# Patient Record
Sex: FEMALE | Race: WHITE
[De-identification: names, ages, dates, MRNs, and addresses within clinical notes are randomized per-mention and may not be internally consistent; named-entity substitution may affect disease eponyms.]

---

## 2019-07-25 ENCOUNTER — HOSPITAL ENCOUNTER (EMERGENCY)
Dept: HOSPITAL 25 - UCEAST | Age: 84
Discharge: HOME | End: 2019-07-25
Payer: MEDICARE

## 2019-07-25 VITALS — DIASTOLIC BLOOD PRESSURE: 100 MMHG | SYSTOLIC BLOOD PRESSURE: 143 MMHG

## 2019-07-25 DIAGNOSIS — F42.4: ICD-10-CM
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
## 2019-07-25 DIAGNOSIS — S01.01XD: Primary | ICD-10-CM

## 2019-07-25 DIAGNOSIS — F03.90: ICD-10-CM

## 2019-07-25 PROCEDURE — G0463 HOSPITAL OUTPT CLINIC VISIT: HCPCS

## 2019-07-25 PROCEDURE — 99202 OFFICE O/P NEW SF 15 MIN: CPT

## 2019-07-25 NOTE — UC
Laceration HPI





- HPI Summary


HPI Summary: 





86 yo female presents accompanied by daughter for staple removal. Pt suffers 

from dementia, thus the history is provided by the daughter. She tells me that 

pt fell 1 week ago and was seen at an ER (not [facility]) and had imaging which was 

normal. Had 1 staple placed to a small scalp laceration. Also, pt has been 

itching for years and has some excoriations and skin tears to her forearms due 

to this. 





- History Of Current Complaint


Stated Complaint: SUTURE REMOVAL


Time Seen by Provider: 07/25/19 14:03


Hx Obtained From: Family/Caretaker


Hx From Patient Unobtainable Due To: Dementia


Laceration Location: Head


Mechanism Of Injury: Blunt Trauma





- Allergies/Home Medications


Allergies/Adverse Reactions: 


 Allergies











Allergy/AdvReac Type Severity Reaction Status Date / Time


 


ciprofloxacin [From Cipro] Allergy  Rash Verified 07/25/19 14:08


 


fentanyl Allergy  Rash Verified 07/25/19 14:08














PMH/Surg Hx/FS Hx/Imm Hx


Neurological History: Dementia





- Surgical History


Surgical History: Unable to Obtain/Confirm - daughter unsure





- Family History


Known Family History: Positive: Non-Contributory





- Social History


Occupation: Retired


Lives: With Family


Alcohol Use: None


Substance Use Type: None


Smoking Status (MU): Never Smoked Tobacco





Review of Systems


All Other Systems Reviewed And Are Negative: Yes


Constitutional: Positive: Negative


Skin: Positive: Other - One staple in place scalp. Excoriations b/l forearms


Respiratory: Positive: Negative


Cardiovascular: Positive: Negative


Neurovascular: Positive: Negative


Neurological: Positive: Negative


Psychological: Positive: Negative





Physical Exam





- Summary


Physical Exam Summary: 





GENERAL: NAD. WDWN. No pain distress.


SKIN: Posterior scalp with 1 staple in place. Laceration healed well with good 

approximation. No bleeding, drainage, or tenderness. B/L forearms scattered 5mm 

superficial skin avulsions due to itching and excoriations. No drainage, 

streaking, edema, erythema.


CHEST:  No accessory muscle use. Breathing comfortably and in no distress.


CV:  Pulses intact. Cap refill <2seconds


NEURO: Alert.


PSYCH: Age appropriate behavior.





Triage Information Reviewed: Yes


Vital Signs: 





Vital Signs:











Temp Pulse Resp BP Pulse Ox


 


 98 F   100   20   143/100   100 


 


 07/25/19 14:02  07/25/19 14:02  07/25/19 14:02  07/25/19 14:02  07/25/19 14:02











Vital Signs Reviewed: Yes





Laceration Course/Dx





- Course/Dx


Course Of Treatment: 





1 staple removed without difficulty.


Skin avulsions/excoriations - neosporin applied and bandaged with telfa and 

kerlix. Advised to change dressings daily until well healed and f/u with PCP 

next week. 





- Diagnosis


Provider Diagnosis: 


 Removal of staple, Excoriation of forearm








Discharge





- Sign-Out/Discharge


Documenting (check all that apply): Patient Departure


All imaging exams completed and their final reports reviewed: No Studies





- Discharge Plan


Condition: Stable


Disposition: HOME


Patient Education Materials:  Stitches Removal (ED)


Referrals: 


No Primary Care Phys,NOPCP [Primary Care Provider] - 


Additional Instructions: 


If you develop a fever, shortness of breath, chest pain, new or worsening 

symptoms - please call your PCP or go to the ED immediately.


 





Your blood pressure was high at todays visit. Please see your primary provider 

within 4 weeks for recheck and re-evaluation.








- Billing Disposition and Condition


Condition: STABLE


Disposition: Home





- Attestation Statements


Provider Attestation: 





Per institutional requirements, I have reviewed the chart, however, I was not 

consulted specifically or made aware of this patient by the  midlevel provider.

  I did not personally evaluate, interact with , or disposition  this patient.

## 2020-12-10 ENCOUNTER — OFFICE VISIT (OUTPATIENT)
Dept: URBAN - METROPOLITAN AREA CLINIC 27 | Facility: CLINIC | Age: 85
End: 2020-12-10
Payer: MEDICARE

## 2020-12-10 DIAGNOSIS — H04.123 DRY EYE SYNDROME OF BILATERAL LACRIMAL GLANDS: ICD-10-CM

## 2020-12-10 PROCEDURE — 92014 COMPRE OPH EXAM EST PT 1/>: CPT | Performed by: OPHTHALMOLOGY

## 2020-12-10 PROCEDURE — 92134 CPTRZ OPH DX IMG PST SGM RTA: CPT | Performed by: OPHTHALMOLOGY

## 2020-12-10 ASSESSMENT — INTRAOCULAR PRESSURE
OD: 13
OS: 10

## 2020-12-10 NOTE — IMPRESSION/PLAN
Impression: Dry eye syndrome of bilateral lacrimal glands: H04.123. OU. Plan: Patient notes a decline in vision OD. PEE present on exam. Recommend switch to PF- AFT's as needed for comfort. May consider allergy drop.

## 2020-12-10 NOTE — IMPRESSION/PLAN
Impression: Bilateral nonexudative age-related macular degeneration, advanced atrophic w/ subfoveal involvement: H35.3134. OD>OS. Status: Symptomatic. Plan: Exam and OCT confirm the presence of RPE changes and atrophy are consistent with with GA. The diagnosis, natural history, and prognosis of dry AMD were discussed at length. The patient was instructed to continue taking AREDS2 vitamins and informed that smoking increases the risk of blindness for this disease. The patient was educated on the proper use of the Amsler grid and encouraged to use it daily to monitor the vision in each eye. The patient was instructed to call our office immediately upon any decreased vision or increased distortion. 


RTC: 4 mo FU with OCT,OU Left arm;

## 2021-03-03 ENCOUNTER — OFFICE VISIT (OUTPATIENT)
Dept: URBAN - METROPOLITAN AREA CLINIC 13 | Facility: CLINIC | Age: 86
End: 2021-03-03
Payer: MEDICARE

## 2021-03-03 PROCEDURE — 92134 CPTRZ OPH DX IMG PST SGM RTA: CPT | Performed by: OPHTHALMOLOGY

## 2021-03-03 PROCEDURE — 92014 COMPRE OPH EXAM EST PT 1/>: CPT | Performed by: OPHTHALMOLOGY

## 2021-03-03 ASSESSMENT — INTRAOCULAR PRESSURE
OS: 12
OD: 12

## 2021-03-03 NOTE — IMPRESSION/PLAN
Impression: Bilateral nonexudative age-related macular degeneration, advanced atrophic w/ subfoveal involvement: H35.3134. OD>OS. Status: Symptomatic. Plan: Exam and OCT confirm the presence of RPE changes and atrophy are consistent with with GA. The diagnosis, natural history, and prognosis of dry AMD were discussed at length. The patient was instructed to continue taking AREDS2 vitamins and informed that smoking increases the risk of blindness for this disease. The patient was educated on the proper use of the Amsler grid and encouraged to use it daily to monitor the vision in each eye. The patient was instructed to call our office immediately upon any decreased vision or increased distortion. 

Will refer for IONIS trial OS

RTC: 4 mo FU with OCT,OU

## 2021-07-07 ENCOUNTER — OFFICE VISIT (OUTPATIENT)
Dept: URBAN - METROPOLITAN AREA CLINIC 13 | Facility: CLINIC | Age: 86
End: 2021-07-07
Payer: MEDICARE

## 2021-07-07 DIAGNOSIS — H35.3134 BILATERAL NONEXUDATIVE AGE-RELATED MACULAR DEGENERATION, ADVANCED ATROPHIC W/ SUBFOVEAL INVOLVEMENT: Primary | ICD-10-CM

## 2021-07-07 PROCEDURE — 92014 COMPRE OPH EXAM EST PT 1/>: CPT | Performed by: OPHTHALMOLOGY

## 2021-07-07 PROCEDURE — 92134 CPTRZ OPH DX IMG PST SGM RTA: CPT | Performed by: OPHTHALMOLOGY

## 2021-07-07 ASSESSMENT — INTRAOCULAR PRESSURE
OS: 9
OD: 9

## 2021-07-07 NOTE — IMPRESSION/PLAN
Impression: Bilateral nonexudative age-related macular degeneration, advanced atrophic w/ subfoveal involvement: H35.3134. OD>OS. Status: Symptomatic. Plan: Exam and OCT confirm the presence of RPE changes and atrophy are consistent with with GA. The diagnosis, natural history, and prognosis of dry AMD were discussed at length. The patient was instructed to continue taking AREDS2 vitamins and informed that smoking increases the risk of blindness for this disease. The patient was educated on the proper use of the Amsler grid and encouraged to use it daily to monitor the vision in each eye. The patient was instructed to call our office immediately upon any decreased vision or increased distortion. Will refer for IONIS trial OS (was scheduled for screening in March, but she had a fall) RTC: 4 mo FU with OCT OU (if she does not qualify)

## 2021-11-18 ENCOUNTER — OFFICE VISIT (OUTPATIENT)
Dept: URBAN - METROPOLITAN AREA CLINIC 7 | Facility: CLINIC | Age: 86
End: 2021-11-18
Payer: MEDICARE

## 2021-11-18 DIAGNOSIS — Z96.1 PRESENCE OF INTRAOCULAR LENS: ICD-10-CM

## 2021-11-18 PROCEDURE — 92134 CPTRZ OPH DX IMG PST SGM RTA: CPT | Performed by: OPHTHALMOLOGY

## 2021-11-18 PROCEDURE — 92014 COMPRE OPH EXAM EST PT 1/>: CPT | Performed by: OPHTHALMOLOGY

## 2021-11-18 RX ORDER — DIVALPROEX SODIUM 500 MG/1
500 MG TABLET, DELAYED RELEASE ORAL
Qty: 0 | Refills: 0 | Status: ACTIVE
Start: 2021-11-18

## 2021-11-18 ASSESSMENT — INTRAOCULAR PRESSURE
OS: 14
OD: 12

## 2021-11-18 NOTE — IMPRESSION/PLAN
Impression: Bilateral nonexudative age-related macular degeneration, advanced atrophic w/ subfoveal involvement: H35.3134. OD>OS. Status: Symptomatic. Bilateral. Plan: Returns reporting worsening vision. Exam/OCT reveals RPE atrophy with no evidence of CNV OU. Discussed findings and natural history f condition with patient and son. Discussed risk for developing NV AMD.  Will continue to monitor.    

Return in 3 months, OCT OU

## 2021-11-18 NOTE — IMPRESSION/PLAN
Impression: Dry eye syndrome of bilateral lacrimal glands: H04.123. OU. Bilateral. Plan: Recommend frequent ATs.

## 2022-04-20 ENCOUNTER — OFFICE VISIT (OUTPATIENT)
Dept: URBAN - METROPOLITAN AREA CLINIC 7 | Facility: CLINIC | Age: 87
End: 2022-04-20
Payer: MEDICARE

## 2022-04-20 DIAGNOSIS — H04.123 DRY EYE SYNDROME OF BILATERAL LACRIMAL GLANDS: ICD-10-CM

## 2022-04-20 DIAGNOSIS — H54.8 LEGAL BLINDNESS, AS DEFINED IN USA: ICD-10-CM

## 2022-04-20 DIAGNOSIS — Z96.1 PRESENCE OF INTRAOCULAR LENS: ICD-10-CM

## 2022-04-20 DIAGNOSIS — H35.3134 BILATERAL NONEXUDATIVE AGE-RELATED MACULAR DEGENERATION, ADVANCED ATROPHIC W/ SUBFOVEAL INVOLVEMENT: Primary | ICD-10-CM

## 2022-04-20 PROCEDURE — 99214 OFFICE O/P EST MOD 30 MIN: CPT | Performed by: OPHTHALMOLOGY

## 2022-04-20 PROCEDURE — 92134 CPTRZ OPH DX IMG PST SGM RTA: CPT | Performed by: OPHTHALMOLOGY

## 2022-04-20 ASSESSMENT — INTRAOCULAR PRESSURE
OD: 17
OS: 14

## 2022-04-20 NOTE — IMPRESSION/PLAN
Impression: Bilateral nonexudative age-related macular degeneration, advanced atrophic w/ subfoveal involvement: H35.3134. OD>OS. Status: Symptomatic. Bilateral. Plan: Returns reporting worsening vision. Exam/OCT reveals RPE atrophy with no evidence of CNV OU. Discussed findings and natural history f condition with patient and son. Discussed risk for developing NV AMD.  Will continue to monitor.    

Return in 9-12 months, OCT OU

## 2022-08-30 ENCOUNTER — OFFICE VISIT (OUTPATIENT)
Dept: URBAN - METROPOLITAN AREA CLINIC 13 | Facility: CLINIC | Age: 87
End: 2022-08-30
Payer: MEDICARE

## 2022-08-30 DIAGNOSIS — H35.3134 BILATERAL NONEXUDATIVE AGE-RELATED MACULAR DEGENERATION, ADVANCED ATROPHIC W/ SUBFOVEAL INVOLVEMENT: Primary | ICD-10-CM

## 2022-08-30 DIAGNOSIS — Z96.1 PRESENCE OF INTRAOCULAR LENS: ICD-10-CM

## 2022-08-30 PROCEDURE — 99214 OFFICE O/P EST MOD 30 MIN: CPT | Performed by: OPHTHALMOLOGY

## 2022-08-30 PROCEDURE — 92134 CPTRZ OPH DX IMG PST SGM RTA: CPT | Performed by: OPHTHALMOLOGY

## 2022-08-30 ASSESSMENT — INTRAOCULAR PRESSURE
OS: 9
OD: 21

## 2022-08-30 NOTE — IMPRESSION/PLAN
Impression: Bilateral nonexudative age-related macular degeneration, advanced atrophic w/ subfoveal involvement: H35.3134. Bilateral.

OCT: 
OD: GA 
OS: GA Plan: Examination and OCT confirm the presence of RPE changes and drusen consistent with dry macular degeneration. The diagnosis, natural history, and prognosis of dry AMD as well as the current lack of treatment were discussed at length. The patient was instructed to begin taking AREDS 2 protocol anti-oxidant vitamins. The use of an Amsler grid and the importance of weekly self-monitoring were reviewed. The patient understands that smoking is the most significant modifiable risk factor for the development of advanced AMD, and also understands that if they do smoke (or have history of smoking in the past 5 years), they cannot take vitamin A/beta-carotene, since it may increase their risk for lung cancer. The patient was instructed to call our office immediately upon any decreased vision or increased symptoms. RTC 1 year OCT/DFE OU re-eval
Refer to Research Refer to [facility] [facility]

## 2023-01-25 ENCOUNTER — OFFICE VISIT (OUTPATIENT)
Dept: URBAN - METROPOLITAN AREA CLINIC 7 | Facility: CLINIC | Age: 88
End: 2023-01-25
Payer: MEDICARE

## 2023-01-25 DIAGNOSIS — Z96.1 PRESENCE OF INTRAOCULAR LENS: ICD-10-CM

## 2023-01-25 DIAGNOSIS — H35.3134 BILATERAL NONEXUDATIVE AGE-RELATED MACULAR DEGENERATION, ADVANCED ATROPHIC W/ SUBFOVEAL INVOLVEMENT: Primary | ICD-10-CM

## 2023-01-25 PROCEDURE — 92134 CPTRZ OPH DX IMG PST SGM RTA: CPT | Performed by: OPHTHALMOLOGY

## 2023-01-25 PROCEDURE — 92014 COMPRE OPH EXAM EST PT 1/>: CPT | Performed by: OPHTHALMOLOGY

## 2023-01-25 ASSESSMENT — INTRAOCULAR PRESSURE
OS: 11
OD: 11

## 2023-01-25 NOTE — IMPRESSION/PLAN
Impression: Bilateral nonexudative age-related macular degeneration, advanced atrophic w/ subfoveal involvement: H35.3134. Bilateral.

OCT: 
OD: GA 
OS: GA Plan: Examination and OCT confirm the presence of RPE changes and drusen consistent with dry macular degeneration. The diagnosis, natural history, and prognosis of dry AMD as well as the current lack of treatment were discussed at length. The patient was instructed to begin taking AREDS 2 protocol anti-oxidant vitamins. The use of an Amsler grid and the importance of weekly self-monitoring were reviewed. The patient understands that smoking is the most significant modifiable risk factor for the development of advanced AMD, and also understands that if they do smoke (or have history of smoking in the past 5 years), they cannot take vitamin A/beta-carotene, since it may increase their risk for lung cancer. The patient was instructed to call our office immediately upon any decreased vision or increased symptoms. RTC 1 year OCT/DFE OU re-eval
Refer to Research Refer to [address] [facility]